# Patient Record
Sex: FEMALE | URBAN - METROPOLITAN AREA
[De-identification: names, ages, dates, MRNs, and addresses within clinical notes are randomized per-mention and may not be internally consistent; named-entity substitution may affect disease eponyms.]

---

## 2023-03-11 ENCOUNTER — CLAIMS CREATED FROM THE CLAIM WINDOW (OUTPATIENT)
Dept: URBAN - METROPOLITAN AREA MEDICAL CENTER 25 | Facility: MEDICAL CENTER | Age: 58
End: 2023-03-11
Payer: COMMERCIAL

## 2023-03-11 ENCOUNTER — OUT OF OFFICE VISIT (OUTPATIENT)
Dept: URBAN - METROPOLITAN AREA MEDICAL CENTER 25 | Facility: MEDICAL CENTER | Age: 58
End: 2023-03-11

## 2023-03-11 DIAGNOSIS — R07.89 ACUTE CHEST WALL PAIN: ICD-10-CM

## 2023-03-11 DIAGNOSIS — K21.9 ACID REFLUX: ICD-10-CM

## 2023-03-11 DIAGNOSIS — R11.0 AM NAUSEA: ICD-10-CM

## 2023-03-11 PROCEDURE — 99204 OFFICE O/P NEW MOD 45 MIN: CPT | Performed by: STUDENT IN AN ORGANIZED HEALTH CARE EDUCATION/TRAINING PROGRAM

## 2023-03-12 ENCOUNTER — LAB OUTSIDE AN ENCOUNTER (OUTPATIENT)
Dept: URBAN - METROPOLITAN AREA CLINIC 19 | Facility: CLINIC | Age: 58
End: 2023-03-12

## 2023-03-15 ENCOUNTER — WEB ENCOUNTER (OUTPATIENT)
Dept: URBAN - METROPOLITAN AREA CLINIC 19 | Facility: CLINIC | Age: 58
End: 2023-03-15

## 2023-03-15 ENCOUNTER — LAB OUTSIDE AN ENCOUNTER (OUTPATIENT)
Dept: URBAN - METROPOLITAN AREA CLINIC 19 | Facility: CLINIC | Age: 58
End: 2023-03-15

## 2023-03-15 ENCOUNTER — TELEPHONE ENCOUNTER (OUTPATIENT)
Dept: URBAN - METROPOLITAN AREA CLINIC 19 | Facility: CLINIC | Age: 58
End: 2023-03-15

## 2023-03-15 ENCOUNTER — OFFICE VISIT (OUTPATIENT)
Dept: URBAN - METROPOLITAN AREA CLINIC 19 | Facility: CLINIC | Age: 58
End: 2023-03-15
Payer: COMMERCIAL

## 2023-03-15 VITALS
BODY MASS INDEX: 27.49 KG/M2 | HEIGHT: 65 IN | WEIGHT: 165 LBS | SYSTOLIC BLOOD PRESSURE: 122 MMHG | DIASTOLIC BLOOD PRESSURE: 82 MMHG

## 2023-03-15 DIAGNOSIS — R19.7 DIARRHEA OF PRESUMED INFECTIOUS ORIGIN: ICD-10-CM

## 2023-03-15 DIAGNOSIS — R07.89 ATYPICAL CHEST PAIN: ICD-10-CM

## 2023-03-15 PROBLEM — 79922009: Status: ACTIVE | Noted: 2023-03-15

## 2023-03-15 PROBLEM — 102589003: Status: ACTIVE | Noted: 2023-03-15

## 2023-03-15 PROBLEM — 386211005: Status: ACTIVE | Noted: 2023-03-15

## 2023-03-15 PROBLEM — 203082005: Status: ACTIVE | Noted: 2023-03-15

## 2023-03-15 PROBLEM — 43240000: Status: ACTIVE | Noted: 2023-03-15

## 2023-03-15 PROCEDURE — 99214 OFFICE O/P EST MOD 30 MIN: CPT | Performed by: STUDENT IN AN ORGANIZED HEALTH CARE EDUCATION/TRAINING PROGRAM

## 2023-03-15 RX ORDER — PANTOPRAZOLE SODIUM 40 MG/1
1 TABLET TABLET, DELAYED RELEASE ORAL ONCE A DAY
Status: ACTIVE | COMMUNITY

## 2023-03-15 RX ORDER — FAMOTIDINE 40 MG/1
1 TABLET AT BEDTIME TABLET, FILM COATED ORAL ONCE A DAY
Status: ACTIVE | COMMUNITY

## 2023-03-15 NOTE — HPI-TODAY'S VISIT:
3/15/2023:  Frances: The pt is a 58 yo F who was seen over the weekend at [facility] for a consult.  Atypical chest pain.  Previously has been following with Dr. Garduno (Olivia Chowdary at [facility].  Was not able to obtain a clinic appt when she called his clinic about 2 weeks ago.  Had epigastric pain, pyrosis which she described as acute, radiating to both sides of her chest and up her jaw.  Not relieved by the PPI, Maalox and her typical gi meds.  Was directed to urgent care or  ER by their office and proceeded to present there.  Had ST and T wave changes on EKG at the ER, with pain relieved by 3 doses of nitroglycerine.  Was seen by cardiology.  Was told normal cardiology workup, including echo.  they signed off and gi was consulted.  She underwent egd with me.  Mild gastropathy noted in the stomach but otherwise normal exam.  Biopsied esophagus to r/o EoE, stomach and small bowel.  Biopsy results are still pending as of today.  Today her concerns are as follows:  She called Dr. Chowdary at discharge and was seen by him in clinic monday.  Pepcid added to her meds.  She was recommended ph manometry testing.  She was not able to get is scheduled till May.  So she came to [facility] after calling and learning that she could get it set up here in April apparently.  Says she has to keep taking nitroglycerin for her issues.  Insists it is a gi issues with the regurgitation and the loose LES.  Discussed with Dr. Chowdary who told her he could recommend a fundoplication.  She is under the impression this is done by GI.  I discussed with her that without a hiatal hernia, fundoplication may not be the right answer for her symptoms, but will evaluate and send to bariatric surgery if the manometry is non-revealing. Offered her Peppermint oil for spasms, which she states does not help. Has not been able to get stool study in the hospital and was discharge.  Was given a stool kit by Dr. Chowdary, and again was unable to give him a sample.  He has had a discussion re post-infectious diarrhea with her. She tells me that she would probably follow up with us in the future as it is closer to where she lives.

## 2023-03-16 ENCOUNTER — TELEPHONE ENCOUNTER (OUTPATIENT)
Dept: URBAN - METROPOLITAN AREA CLINIC 80 | Facility: CLINIC | Age: 58
End: 2023-03-16

## 2023-03-18 LAB — CLOSTRIDIUM DIFFICILE TOXINB,QL REAL TIME PCR: NOT DETECTED

## 2023-03-19 LAB
PERFORMING LAB: (no result)
PERFORMING LAB: (no result)

## 2023-03-23 ENCOUNTER — OFFICE VISIT (OUTPATIENT)
Dept: URBAN - METROPOLITAN AREA MEDICAL CENTER 28 | Facility: MEDICAL CENTER | Age: 58
End: 2023-03-23
Payer: COMMERCIAL

## 2023-03-23 DIAGNOSIS — R07.89 ACUTE CHEST WALL PAIN: ICD-10-CM

## 2023-03-23 DIAGNOSIS — K21.9 ACID REFLUX: ICD-10-CM

## 2023-03-23 PROCEDURE — 91038 ESOPH IMPED FUNCT TEST > 1HR: CPT | Performed by: INTERNAL MEDICINE

## 2023-03-23 PROCEDURE — 91010 ESOPHAGUS MOTILITY STUDY: CPT | Performed by: INTERNAL MEDICINE

## 2023-03-24 ENCOUNTER — TELEPHONE ENCOUNTER (OUTPATIENT)
Dept: URBAN - METROPOLITAN AREA CLINIC 19 | Facility: CLINIC | Age: 58
End: 2023-03-24

## 2023-04-07 ENCOUNTER — WEB ENCOUNTER (OUTPATIENT)
Dept: URBAN - METROPOLITAN AREA CLINIC 19 | Facility: CLINIC | Age: 58
End: 2023-04-07

## 2023-04-12 ENCOUNTER — OFFICE VISIT (OUTPATIENT)
Dept: URBAN - METROPOLITAN AREA CLINIC 128 | Facility: CLINIC | Age: 58
End: 2023-04-12

## 2023-06-01 ENCOUNTER — OFFICE VISIT (OUTPATIENT)
Dept: URBAN - METROPOLITAN AREA CLINIC 19 | Facility: CLINIC | Age: 58
End: 2023-06-01
Payer: COMMERCIAL

## 2023-06-01 ENCOUNTER — LAB OUTSIDE AN ENCOUNTER (OUTPATIENT)
Dept: URBAN - METROPOLITAN AREA CLINIC 19 | Facility: CLINIC | Age: 58
End: 2023-06-01

## 2023-06-01 VITALS
HEIGHT: 65 IN | DIASTOLIC BLOOD PRESSURE: 80 MMHG | SYSTOLIC BLOOD PRESSURE: 120 MMHG | WEIGHT: 154.6 LBS | BODY MASS INDEX: 25.76 KG/M2

## 2023-06-01 DIAGNOSIS — R19.4 CHANGE IN BOWEL HABITS: ICD-10-CM

## 2023-06-01 DIAGNOSIS — R63.0 LACK OF APPETITE: ICD-10-CM

## 2023-06-01 PROBLEM — 79890006: Status: ACTIVE | Noted: 2023-06-01

## 2023-06-01 PROCEDURE — 99214 OFFICE O/P EST MOD 30 MIN: CPT | Performed by: INTERNAL MEDICINE

## 2023-06-01 RX ORDER — FAMOTIDINE 40 MG/1
1 TABLET AT BEDTIME TABLET, FILM COATED ORAL ONCE A DAY
Status: DISCONTINUED | COMMUNITY

## 2023-06-01 RX ORDER — PANTOPRAZOLE SODIUM 40 MG/1
1 TABLET TABLET, DELAYED RELEASE ORAL ONCE A DAY
Status: ACTIVE | COMMUNITY

## 2023-06-01 RX ORDER — SUCRALFATE 1 G/10ML
10 ML ON AN EMPTY STOMACH SUSPENSION ORAL TWICE A DAY
Qty: 600 | Refills: 1 | OUTPATIENT
Start: 2023-06-01 | End: 2023-07-31

## 2023-06-01 RX ORDER — PANTOPRAZOLE SODIUM 40 MG/1
1 TABLET TABLET, DELAYED RELEASE ORAL TWICE A DAY
Qty: 60 TABLET | Refills: 1 | OUTPATIENT
Start: 2023-06-01

## 2023-06-01 RX ORDER — ATORVASTATIN CALCIUM 40 MG/1
1 TABLET TABLET, FILM COATED ORAL ONCE A DAY
Status: ACTIVE | COMMUNITY

## 2023-06-01 RX ORDER — GABAPENTIN 300 MG/1
1 CAPSULE CAPSULE ORAL ONCE A DAY
Status: ACTIVE | COMMUNITY

## 2023-06-01 RX ORDER — METHOCARBAMOL 500 MG/1
1.5 TABLETS TABLET ORAL
Status: ACTIVE | COMMUNITY

## 2023-06-01 NOTE — HPI-TODAY'S VISIT:
Mrs. Hinkle is a 58 year old female with fibromyalgia, SLE, RA, Raynaud's disease. She follows with Dr. Daniels and was last seen by Dr. Daniels on 3/15/2023. She presents today for earlier clinic followup for diarrhea and loss of appetite.   On 5/22/2023 she underwent hernia repair with Nissen fundoplication with Dr. Patterson.  On 5/27/2023 she went to [facility] for headache and neck pain.   Today she reports having no appetite and having diarrhea. She reports her last BM was on 5/26/2023. She reports being on full liquid diet.   She is on protonix 40mg daily.   Prior history is summarized below:  -She was following with Dr. Garduno ([facility] at [facility]. On 12/16/2021 EGD showed normal esophagus, gaping lower esophageal sphincter, and moderate gastritis. -On 3/12/2023 EGD with Dr. Daniels showed nonerosive esopahgitis and erythema in gastric mucosa.

## 2023-06-05 ENCOUNTER — TELEPHONE ENCOUNTER (OUTPATIENT)
Dept: URBAN - METROPOLITAN AREA CLINIC 19 | Facility: CLINIC | Age: 58
End: 2023-06-05

## 2023-06-06 ENCOUNTER — TELEPHONE ENCOUNTER (OUTPATIENT)
Dept: URBAN - METROPOLITAN AREA CLINIC 19 | Facility: CLINIC | Age: 58
End: 2023-06-06

## 2023-06-15 ENCOUNTER — OFFICE VISIT (OUTPATIENT)
Dept: URBAN - METROPOLITAN AREA CLINIC 19 | Facility: CLINIC | Age: 58
End: 2023-06-15

## 2023-07-11 ENCOUNTER — OFFICE VISIT (OUTPATIENT)
Dept: URBAN - METROPOLITAN AREA CLINIC 19 | Facility: CLINIC | Age: 58
End: 2023-07-11

## 2023-08-17 ENCOUNTER — OFFICE VISIT (OUTPATIENT)
Dept: URBAN - METROPOLITAN AREA CLINIC 19 | Facility: CLINIC | Age: 58
End: 2023-08-17

## 2024-01-25 ENCOUNTER — CLAIMS CREATED FROM THE CLAIM WINDOW (OUTPATIENT)
Dept: URBAN - METROPOLITAN AREA MEDICAL CENTER 10 | Facility: MEDICAL CENTER | Age: 59
End: 2024-01-25
Payer: COMMERCIAL

## 2024-01-25 DIAGNOSIS — R63.0 ALMOST NO APPETITE: ICD-10-CM

## 2024-01-25 DIAGNOSIS — D64.89 ANEMIA DUE TO OTHER CAUSE: ICD-10-CM

## 2024-01-25 DIAGNOSIS — R63.4 ABNORMAL INTENTIONAL WEIGHT LOSS: ICD-10-CM

## 2024-01-25 DIAGNOSIS — R10.11 ABDOMINAL BURNING SENSATION IN RIGHT UPPER QUADRANT: ICD-10-CM

## 2024-01-25 PROCEDURE — 99254 IP/OBS CNSLTJ NEW/EST MOD 60: CPT | Performed by: INTERNAL MEDICINE

## 2024-01-25 PROCEDURE — 99222 1ST HOSP IP/OBS MODERATE 55: CPT | Performed by: INTERNAL MEDICINE

## 2024-01-25 PROCEDURE — G8427 DOCREV CUR MEDS BY ELIG CLIN: HCPCS | Performed by: INTERNAL MEDICINE

## 2024-01-26 ENCOUNTER — CLAIMS CREATED FROM THE CLAIM WINDOW (OUTPATIENT)
Dept: URBAN - METROPOLITAN AREA MEDICAL CENTER 10 | Facility: MEDICAL CENTER | Age: 59
End: 2024-01-26
Payer: COMMERCIAL

## 2024-01-26 DIAGNOSIS — R63.4 ABNORMAL INTENTIONAL WEIGHT LOSS: ICD-10-CM

## 2024-01-26 DIAGNOSIS — R63.0 ALMOST NO APPETITE: ICD-10-CM

## 2024-01-26 DIAGNOSIS — D64.89 ANEMIA DUE TO OTHER CAUSE: ICD-10-CM

## 2024-01-26 DIAGNOSIS — R11.2 ACUTE NAUSEA WITH NONBILIOUS VOMITING: ICD-10-CM

## 2024-01-26 PROCEDURE — 99231 SBSQ HOSP IP/OBS SF/LOW 25: CPT | Performed by: INTERNAL MEDICINE

## 2024-01-27 ENCOUNTER — CLAIMS CREATED FROM THE CLAIM WINDOW (OUTPATIENT)
Dept: URBAN - METROPOLITAN AREA MEDICAL CENTER 10 | Facility: MEDICAL CENTER | Age: 59
End: 2024-01-27
Payer: COMMERCIAL

## 2024-01-27 DIAGNOSIS — R63.0 ALMOST NO APPETITE: ICD-10-CM

## 2024-01-27 DIAGNOSIS — K29.60 ADENOPAPILLOMATOSIS GASTRICA: ICD-10-CM

## 2024-01-27 DIAGNOSIS — R10.11 ABDOMINAL BURNING SENSATION IN RIGHT UPPER QUADRANT: ICD-10-CM

## 2024-01-27 DIAGNOSIS — K31.A12 GASTRIC INTESTINAL METAPLASIA WITHOUT DYSPLASIA, INVOLVING THE BODY (CORPUS): ICD-10-CM

## 2024-01-27 PROCEDURE — 43239 EGD BIOPSY SINGLE/MULTIPLE: CPT | Performed by: INTERNAL MEDICINE

## 2024-01-27 PROCEDURE — 43235 EGD DIAGNOSTIC BRUSH WASH: CPT | Performed by: INTERNAL MEDICINE

## 2024-02-13 ENCOUNTER — LAB (OUTPATIENT)
Dept: URBAN - METROPOLITAN AREA CLINIC 35 | Facility: CLINIC | Age: 59
End: 2024-02-13

## 2024-02-13 ENCOUNTER — OV HOSPF/U (OUTPATIENT)
Dept: URBAN - METROPOLITAN AREA CLINIC 35 | Facility: CLINIC | Age: 59
End: 2024-02-13
Payer: COMMERCIAL

## 2024-02-13 VITALS
SYSTOLIC BLOOD PRESSURE: 118 MMHG | BODY MASS INDEX: 21.16 KG/M2 | HEART RATE: 79 BPM | DIASTOLIC BLOOD PRESSURE: 75 MMHG | HEIGHT: 65 IN | OXYGEN SATURATION: 99 % | WEIGHT: 127 LBS

## 2024-02-13 DIAGNOSIS — R11.0 CHRONIC NAUSEA: ICD-10-CM

## 2024-02-13 DIAGNOSIS — K59.01 CONSTIPATION: ICD-10-CM

## 2024-02-13 DIAGNOSIS — K29.40 ATROPHIC GASTRITIS WITHOUT HEMORRHAGE: ICD-10-CM

## 2024-02-13 DIAGNOSIS — R10.30 LOWER ABDOMINAL PAIN: ICD-10-CM

## 2024-02-13 PROBLEM — 84568007: Status: ACTIVE | Noted: 2024-02-13

## 2024-02-13 PROBLEM — 14760008: Status: ACTIVE | Noted: 2024-02-13

## 2024-02-13 PROCEDURE — 99215 OFFICE O/P EST HI 40 MIN: CPT | Performed by: INTERNAL MEDICINE

## 2024-02-13 RX ORDER — CHOLECALCIFEROL (VITAMIN D3) 50 MCG
1 TABLET TABLET ORAL
Status: ACTIVE | COMMUNITY

## 2024-02-13 RX ORDER — OXYCODONE HYDROCHLORIDE AND ACETAMINOPHEN 5; 325 MG/1; MG/1
1 TABLET TABLET ORAL ONCE A DAY
Refills: 0 | Status: ACTIVE | COMMUNITY

## 2024-02-13 RX ORDER — PREGABALIN 75 MG/1
1 CAPSULE CAPSULE ORAL
Status: ACTIVE | COMMUNITY

## 2024-02-13 RX ORDER — ROSUVASTATIN CALCIUM 10 MG/1
1 TABLET TABLET, FILM COATED ORAL ONCE A DAY
Status: ACTIVE | COMMUNITY

## 2024-02-13 RX ORDER — TRAMADOL HYDROCHLORIDE 50 MG/1
2 TABLETS TABLET, FILM COATED ORAL THREE TIMES A DAY
Status: ACTIVE | COMMUNITY

## 2024-02-13 RX ORDER — PREGABALIN 25 MG/1
1 CAPSULE CAPSULE ORAL TWICE A DAY
Status: ACTIVE | COMMUNITY

## 2024-02-13 RX ORDER — MIRTAZAPINE 15 MG/1
1 TABLET ON THE TONGUE AND ALLOW TO DISSOLVE AT BEDTIME TABLET, ORALLY DISINTEGRATING ORAL ONCE A DAY
Status: ACTIVE | COMMUNITY

## 2024-02-13 RX ORDER — TIZANIDINE 4 MG/1
1 TABLET TABLET ORAL
Status: ACTIVE | COMMUNITY

## 2024-02-13 RX ORDER — TOPIRAMATE 25 MG/1
1 TABLET TABLET, FILM COATED ORAL ONCE A DAY
Status: ACTIVE | COMMUNITY

## 2024-02-13 NOTE — HPI-ENDOSCOPY (EGD) FOLLOWUP
58 year old female patient  with previous history of atypical chest pain, fibromyalgia, epigastric pain presents today for her hospital follow up (lumbar and pelvic pain 1/22-1/27/24). Patient mentions unwanted loss of weight and diminished appetite. She also mentions lumbar pain. Patient currently admits no bowel movement since 01.30.2024 with semisolid to liquid consistency assisted by magnesium oxide without  melena, blood, or mucus. Patient mentions rectal pain.    Today: patient is scheduled for  Lumbar sacral MRI at [facility]  Barium Swallow -  1/22/24  Unremarkable    EGD - 1/27/2024 - Impression  - Z-line regular, 38 cm from the incisors.   - Normal esophagus.   - A Nissen fundoplication was found. The wrap appears intact.   - Erythematous mucosa in the antrum. Biopsied.   - Flattening of gastric folds suggesting atrophy. Biopsied.   - Normal pylorus.   - Patchy mild flattening of duodenal folds in second portion duodenum, otherwise normal duodenum.  Path: Full description below. Gastric body: suspicious for autoimmune gastritis. + IM and pseudopyloric gland metaplasia. Negative H. Pylori   Patient is having increased pain throughout lower abdomen, pelvis, and back. This has been happening since the cervical spine epidural she had done Sept 2023, to treat her cervical spine pain. Patient states her neck pain started since the Nissen fundoplication she had; she thinks was related to neck positioning during the surgery as she woke up with neck pain from surgery.  GI wise, at present time, she has no appetite. Patient has lost 13 pound since January 2024. Since Nissen surgery she has lost 53 pounds. She has nausea and dry heaves; unable to have emesis due to fundoplication. + painful spasms in RUQ, it starts on her back. Episodes are random. Patient has been on multiple meds for back pain. At present time she is using Tramadol. Given Percocet but had severe nausea and dry heaving with it. No dysphagia No BM since hospital discharge. No fever. Patient is wondering if she is having : "stomach seizures". Her brother was diagnosed with it. Only when she is in extreme pain, she describes having an altered mind. Patient has appointment at [facility], Spine clinic Feb 28th. She is having a pelvic and lumbar MRI this AM  Of note: Gallstones incidentally found on prior lumbar MRI   Pathology  A. Duodenum, biopsy:  Duodenal mucosa with no significant histopathologic abnormality.  Duodenal villous architecture is intact and there is no increase in intraepithelial lymphocytes.     B. Stomach, antrum, biopsy :  Mild to moderate chronic gastritis involving gastric antral mucosa.  Negative for intestinal metaplasia (Alcian blue/PAS stain).  Giemsa's stain is negative for Helicobacter pylori-like microorganisms.     C. Stomach, body, biopsy:  Moderate chronic gastritis with atrophic changes, foci of intestinal, and pseudopyloric gland metaplasia.  Micronodular and linear enterochromaffin (ECL) like cell hyperplasia. See comment.  Intestinal metaplasia is highlighted by AB/PAS stain.  Negative for H. Pylori organisms (Giemsa stain).

## 2024-02-23 LAB
FERRITIN, SERUM: 39
FOLATE (FOLIC ACID), SERUM: 10
H PYLORI BREATH TEST: NOT DETECTED
INTRINSIC FACTOR BLOCKING: NEGATIVE
IRON BIND.CAP.(TIBC): 376
IRON SATURATION: 16
IRON: 61
PARIETAL CELL AB SCREEN: POSITIVE
PARIETAL CELL AB TITER: (no result)
VITAMIN B12: 382

## 2024-03-27 ENCOUNTER — OV EP (OUTPATIENT)
Dept: URBAN - METROPOLITAN AREA CLINIC 33 | Facility: CLINIC | Age: 59
End: 2024-03-27

## 2024-03-27 VITALS
SYSTOLIC BLOOD PRESSURE: 110 MMHG | HEART RATE: 68 BPM | HEIGHT: 65 IN | DIASTOLIC BLOOD PRESSURE: 75 MMHG | WEIGHT: 125 LBS | OXYGEN SATURATION: 98 % | BODY MASS INDEX: 20.83 KG/M2

## 2024-03-27 RX ORDER — ROSUVASTATIN CALCIUM 10 MG/1
1 TABLET TABLET, FILM COATED ORAL ONCE A DAY
Status: ACTIVE | COMMUNITY

## 2024-03-27 RX ORDER — PREGABALIN 75 MG/1
1 CAPSULE CAPSULE ORAL
Status: ACTIVE | COMMUNITY

## 2024-03-27 RX ORDER — TOPIRAMATE 25 MG/1
1 TABLET TABLET, FILM COATED ORAL ONCE A DAY
Status: ACTIVE | COMMUNITY

## 2024-03-27 RX ORDER — OXYCODONE HYDROCHLORIDE AND ACETAMINOPHEN 5; 325 MG/1; MG/1
1 TABLET TABLET ORAL ONCE A DAY
Refills: 0 | Status: ACTIVE | COMMUNITY

## 2024-03-27 RX ORDER — CHOLECALCIFEROL (VITAMIN D3) 50 MCG
1 TABLET TABLET ORAL
Status: ACTIVE | COMMUNITY

## 2024-03-27 RX ORDER — TRAMADOL HYDROCHLORIDE 50 MG/1
2 TABLETS TABLET, FILM COATED ORAL THREE TIMES A DAY
Status: ACTIVE | COMMUNITY

## 2024-03-27 RX ORDER — MIRTAZAPINE 15 MG/1
1 TABLET ON THE TONGUE AND ALLOW TO DISSOLVE AT BEDTIME TABLET, ORALLY DISINTEGRATING ORAL ONCE A DAY
Status: ACTIVE | COMMUNITY

## 2024-03-27 RX ORDER — TIZANIDINE 4 MG/1
1 TABLET TABLET ORAL
Status: ACTIVE | COMMUNITY

## 2024-03-27 RX ORDER — LINACLOTIDE 145 UG/1
1 CAPSULE AT LEAST 30 MINUTES BEFORE THE FIRST MEAL OF THE DAY ON AN EMPTY STOMACH CAPSULE, GELATIN COATED ORAL ONCE A DAY
Qty: 90 | Refills: 3 | Status: ACTIVE | COMMUNITY
Start: 2024-03-08 | End: 2025-03-03

## 2024-03-27 RX ORDER — PREGABALIN 25 MG/1
1 CAPSULE CAPSULE ORAL TWICE A DAY
Status: ACTIVE | COMMUNITY

## 2024-03-27 NOTE — HPI-TODAY'S VISIT:
58 year old female patient  with previous history of chronic nausea, constipation, atrophic gastritis, weight loss (decreased appetite) presents today for her 1 month follow up. Patient mentions no change in nausea, decreassed appetite and bloating. Patient admits improvement of symptoms constipation with linzess as prescribed. Patient currently  admits 3-4 bowel movements per day with liquid consistency without pain, melena, blood, or mucus.   Patient is still having pain and spasms in RUQ. Still has to force herself to eat. When she eats, she gets sick with nausea and worsening pain. RUQ US in January showed GS (while in the hospital)  Linzess working to well.   Last Appointment  Linzess 145 mcg    NM Hepatobiliary Scan - 03.13.2024  1. No evidence of cystic duct obstruction.  2. The gallbladder ejection fraction is 22%. Normal range is >30% at any time point. These findings can suggest gallbladder dysfunction in the correct clinical setting, but please note that false positive gallbladder ejection fraction studies can occur.    LABs - 2.19.2024  Parietal cell Ab: Positive  Fe + TIBC: NL  Ferritin: NL  IF: NL  H. Pylori BT: NL

## 2024-05-24 ENCOUNTER — DASHBOARD ENCOUNTERS (OUTPATIENT)
Age: 59
End: 2024-05-24

## 2024-05-24 ENCOUNTER — CLAIMS CREATED FROM THE CLAIM WINDOW (OUTPATIENT)
Dept: URBAN - METROPOLITAN AREA CLINIC 35 | Facility: CLINIC | Age: 59
End: 2024-05-24

## 2024-05-24 ENCOUNTER — LAB OUTSIDE AN ENCOUNTER (OUTPATIENT)
Dept: URBAN - METROPOLITAN AREA CLINIC 35 | Facility: CLINIC | Age: 59
End: 2024-05-24

## 2024-05-24 ENCOUNTER — OFFICE VISIT (OUTPATIENT)
Dept: URBAN - METROPOLITAN AREA CLINIC 35 | Facility: CLINIC | Age: 59
End: 2024-05-24
Payer: COMMERCIAL

## 2024-05-24 VITALS
WEIGHT: 125 LBS | SYSTOLIC BLOOD PRESSURE: 112 MMHG | HEIGHT: 65 IN | DIASTOLIC BLOOD PRESSURE: 72 MMHG | BODY MASS INDEX: 20.83 KG/M2

## 2024-05-24 DIAGNOSIS — K59.09 CHRONIC CONSTIPATION: ICD-10-CM

## 2024-05-24 DIAGNOSIS — R10.12 LEFT UPPER QUADRANT PAIN: ICD-10-CM

## 2024-05-24 DIAGNOSIS — Z12.11 ENCOUNTER FOR SCREENING FOR MALIGNANT NEOPLASM OF COLON: ICD-10-CM

## 2024-05-24 DIAGNOSIS — K29.40 ATROPHIC GASTRITIS WITHOUT HEMORRHAGE: ICD-10-CM

## 2024-05-24 PROCEDURE — 99214 OFFICE O/P EST MOD 30 MIN: CPT | Performed by: PHYSICIAN ASSISTANT

## 2024-05-24 RX ORDER — DULOXETINE 30 MG/1
1 CAPSULE CAPSULE, DELAYED RELEASE PELLETS ORAL ONCE A DAY
Status: ACTIVE | COMMUNITY

## 2024-05-24 RX ORDER — MULTIVITAMIN WITH IRON
AS DIRECTED TABLET ORAL
Status: ACTIVE | COMMUNITY

## 2024-05-24 RX ORDER — ROSUVASTATIN CALCIUM 10 MG/1
1 TABLET TABLET, FILM COATED ORAL ONCE A DAY
Status: ACTIVE | COMMUNITY

## 2024-05-24 RX ORDER — POLYETHYLENE GLYCOL 3350, SODIUM SULFATE, SODIUM CHLORIDE, POTASSIUM CHLORIDE, ASCORBIC ACID, SODIUM ASCORBATE 140-9-5.2G
140 MILLILITER KIT ORAL AS DIRECTED
Qty: 1 | Refills: 0 | OUTPATIENT
Start: 2024-05-24 | End: 2024-05-26

## 2024-05-24 RX ORDER — OXCARBAZEPINE 150 MG/1
1 TABLET TABLET, FILM COATED ORAL TWICE A DAY
Status: ACTIVE | COMMUNITY

## 2024-05-29 ENCOUNTER — TELEPHONE ENCOUNTER (OUTPATIENT)
Dept: URBAN - METROPOLITAN AREA CLINIC 35 | Facility: CLINIC | Age: 59
End: 2024-05-29

## 2024-06-01 ENCOUNTER — WEB ENCOUNTER (OUTPATIENT)
Dept: URBAN - METROPOLITAN AREA CLINIC 35 | Facility: CLINIC | Age: 59
End: 2024-06-01

## 2024-07-19 ENCOUNTER — OFFICE VISIT (OUTPATIENT)
Dept: URBAN - METROPOLITAN AREA MEDICAL CENTER 10 | Facility: MEDICAL CENTER | Age: 59
End: 2024-07-19
Payer: COMMERCIAL

## 2024-07-19 DIAGNOSIS — Z12.11 COLON CANCER SCREENING: ICD-10-CM

## 2024-07-19 PROCEDURE — 0528F RCMND FLW-UP 10 YRS DOCD: CPT | Performed by: INTERNAL MEDICINE

## 2024-07-19 PROCEDURE — G0121 COLON CA SCRN NOT HI RSK IND: HCPCS | Performed by: INTERNAL MEDICINE

## 2024-07-19 RX ORDER — OXCARBAZEPINE 150 MG/1
1 TABLET TABLET, FILM COATED ORAL TWICE A DAY
Status: ACTIVE | COMMUNITY

## 2024-07-19 RX ORDER — DULOXETINE 30 MG/1
1 CAPSULE CAPSULE, DELAYED RELEASE PELLETS ORAL ONCE A DAY
Status: ACTIVE | COMMUNITY

## 2024-07-19 RX ORDER — ROSUVASTATIN CALCIUM 10 MG/1
1 TABLET TABLET, FILM COATED ORAL ONCE A DAY
Status: ACTIVE | COMMUNITY

## 2024-07-19 RX ORDER — MULTIVITAMIN WITH IRON
AS DIRECTED TABLET ORAL
Status: ACTIVE | COMMUNITY

## 2024-07-29 ENCOUNTER — LAB OUTSIDE AN ENCOUNTER (OUTPATIENT)
Dept: URBAN - METROPOLITAN AREA CLINIC 35 | Facility: CLINIC | Age: 59
End: 2024-07-29

## 2024-07-29 ENCOUNTER — TELEPHONE ENCOUNTER (OUTPATIENT)
Dept: URBAN - METROPOLITAN AREA CLINIC 35 | Facility: CLINIC | Age: 59
End: 2024-07-29

## 2024-07-29 RX ORDER — ONDANSETRON 4 MG/1
1 TABLET ON THE TONGUE AND ALLOW TO DISSOLVE TABLET, ORALLY DISINTEGRATING ORAL
Qty: 30 TABLET | Refills: 0 | OUTPATIENT
Start: 2024-07-29

## 2024-08-01 ENCOUNTER — OFFICE VISIT (OUTPATIENT)
Dept: URBAN - METROPOLITAN AREA CLINIC 35 | Facility: CLINIC | Age: 59
End: 2024-08-01
Payer: COMMERCIAL

## 2024-08-01 VITALS
WEIGHT: 128 LBS | SYSTOLIC BLOOD PRESSURE: 110 MMHG | HEIGHT: 65 IN | DIASTOLIC BLOOD PRESSURE: 74 MMHG | BODY MASS INDEX: 21.33 KG/M2

## 2024-08-01 DIAGNOSIS — R07.89 OTHER CHEST PAIN: ICD-10-CM

## 2024-08-01 DIAGNOSIS — K29.40 ATROPHIC GASTRITIS WITHOUT HEMORRHAGE: ICD-10-CM

## 2024-08-01 PROCEDURE — 99214 OFFICE O/P EST MOD 30 MIN: CPT | Performed by: PHYSICIAN ASSISTANT

## 2024-08-01 RX ORDER — ROSUVASTATIN CALCIUM 10 MG/1
1 TABLET TABLET, FILM COATED ORAL ONCE A DAY
Status: ACTIVE | COMMUNITY

## 2024-08-01 RX ORDER — ONDANSETRON 4 MG/1
1 TABLET ON THE TONGUE AND ALLOW TO DISSOLVE TABLET, ORALLY DISINTEGRATING ORAL
Qty: 30 TABLET | Refills: 0 | Status: ACTIVE | COMMUNITY
Start: 2024-07-29

## 2024-08-01 RX ORDER — OXCARBAZEPINE 150 MG/1
1 TABLET TABLET, FILM COATED ORAL TWICE A DAY
Status: ACTIVE | COMMUNITY

## 2024-08-01 RX ORDER — DULOXETINE 30 MG/1
1 CAPSULE CAPSULE, DELAYED RELEASE PELLETS ORAL ONCE A DAY
Status: ACTIVE | COMMUNITY

## 2024-08-01 RX ORDER — MULTIVITAMIN WITH IRON
AS DIRECTED TABLET ORAL
Status: ACTIVE | COMMUNITY

## 2024-08-01 NOTE — PHYSICAL EXAM GASTROINTESTINAL
Abdomen , soft, +diffuse tender, nondistended , +guarding or rigidity , no masses palpable , normal bowel sounds , Liver and Spleen,  no hepatosplenomegaly , liver nontender

## 2024-08-01 NOTE — HPI-TODAY'S VISIT:
Patient presents today for a follow up. She admits continued nausea since her colonoscopy 07/19 which was essentially normal. She states that she hasn't been able to have a BM since last Tuesday. She has tried Milk of Mag, an enema, and Linzess 145mcg but nothing seemed to be working. She admitted she went to urgent care and got an Xray and she also went to [facility]. She admits she drank 2 10 oz bottles of magnesium citrate last night and she has been having watery bowel movements. She admits she is having significant pain. She is having watery mud coming out finally.  She also has a UTI, per U/A, however was not given abx.  Zofran not helping with the nausea.  CT abd/pelvis Moderate feces within the colon and rectum  Colonoscopy report shows: -Preparation of the colon was fair. -Diverticulosis in the sigmoid colon. -Anal papilla were hypertrophied. -No specimens collected.  Last visit: Patient presents today for a follow up of nausea, constipation, and weight loss. She denies any continued nausea, decreased appetite, and bloating. She denies any weight loss. She denies taking Linzess 72 mcg. She currently admits normal bowel habits. She admits she has been having sharp pain under the left rib cageand it shoots across her ribs. Onset was two weeks ago with no provoking factors.  She did have her gallbladder removal in April with Dr. Patterson. She admits to pain in her tailbone that began in September of last year. She admits she is doing pelvic floor therapy and is unsure if she needs a colonoscopy for this. She has constant pain in her tailbone, and started after her cervical epidural.   Last visit: 58-year-old female patient  with previous history of chronic nausea, constipation, atrophic gastritis, weight loss (decreased appetite), presents today for her 1-month follow up. Patient mentions no change in nausea, decreased appetite and bloating. Patient admits improvement of symptoms constipation with Linzess as prescribed. Patient currently  admits 3-4 bowel movements per day with liquid consistency without pain, melena, blood, or mucus.   Patient is still having pain and spasms in RUQ. Still has to force herself to eat. When she eats, she gets sick with nausea and worsening pain. RUQ US in January showed GS (while in the hospital)  Linzess working too well.   Last Appointment  Linzess 145 mcg    NM Hepatobiliary Scan - 03.13.2024  1. No evidence of cystic duct obstruction.  2. The gallbladder ejection fraction is 22%. Normal range is >30% at any time point. These findings can suggest gallbladder dysfunction in the correct clinical setting, but please note that false positive gallbladder ejection fraction studies can occur.    LABs - 2.19.2024  Parietal cell Ab: Positive  Fe + TIBC: NL  Ferritin: NL  IF: NL  H. Pylori BT: NL

## 2024-08-02 ENCOUNTER — LAB OUTSIDE AN ENCOUNTER (OUTPATIENT)
Dept: URBAN - METROPOLITAN AREA CLINIC 35 | Facility: CLINIC | Age: 59
End: 2024-08-02

## 2024-08-02 ENCOUNTER — WEB ENCOUNTER (OUTPATIENT)
Dept: URBAN - METROPOLITAN AREA CLINIC 35 | Facility: CLINIC | Age: 59
End: 2024-08-02

## 2024-08-02 ENCOUNTER — TELEPHONE ENCOUNTER (OUTPATIENT)
Dept: URBAN - METROPOLITAN AREA CLINIC 35 | Facility: CLINIC | Age: 59
End: 2024-08-02

## 2024-08-05 ENCOUNTER — OFFICE VISIT (OUTPATIENT)
Dept: URBAN - METROPOLITAN AREA CLINIC 35 | Facility: CLINIC | Age: 59
End: 2024-08-05
Payer: COMMERCIAL

## 2024-08-05 ENCOUNTER — LAB OUTSIDE AN ENCOUNTER (OUTPATIENT)
Dept: URBAN - METROPOLITAN AREA CLINIC 35 | Facility: CLINIC | Age: 59
End: 2024-08-05

## 2024-08-05 ENCOUNTER — TELEPHONE ENCOUNTER (OUTPATIENT)
Dept: URBAN - METROPOLITAN AREA CLINIC 35 | Facility: CLINIC | Age: 59
End: 2024-08-05

## 2024-08-05 VITALS
DIASTOLIC BLOOD PRESSURE: 72 MMHG | BODY MASS INDEX: 20.99 KG/M2 | WEIGHT: 126 LBS | SYSTOLIC BLOOD PRESSURE: 114 MMHG | HEIGHT: 65 IN

## 2024-08-05 DIAGNOSIS — K29.40 ATROPHIC GASTRITIS WITHOUT HEMORRHAGE: ICD-10-CM

## 2024-08-05 DIAGNOSIS — R10.13 EPIGASTRIC PAIN: ICD-10-CM

## 2024-08-05 DIAGNOSIS — R11.2 NAUSEA AND VOMITING, UNSPECIFIED VOMITING TYPE: ICD-10-CM

## 2024-08-05 DIAGNOSIS — R68.81 EARLY SATIETY: ICD-10-CM

## 2024-08-05 PROCEDURE — 99214 OFFICE O/P EST MOD 30 MIN: CPT | Performed by: PHYSICIAN ASSISTANT

## 2024-08-05 RX ORDER — MULTIVITAMIN WITH IRON
AS DIRECTED TABLET ORAL
Status: ACTIVE | COMMUNITY

## 2024-08-05 RX ORDER — ONDANSETRON 4 MG/1
1 TABLET ON THE TONGUE AND ALLOW TO DISSOLVE TABLET, ORALLY DISINTEGRATING ORAL
Qty: 30 TABLET | Refills: 0 | Status: ACTIVE | COMMUNITY
Start: 2024-07-29

## 2024-08-05 RX ORDER — DULOXETINE 30 MG/1
1 CAPSULE CAPSULE, DELAYED RELEASE PELLETS ORAL ONCE A DAY
Status: ACTIVE | COMMUNITY

## 2024-08-05 RX ORDER — ROSUVASTATIN CALCIUM 10 MG/1
1 TABLET TABLET, FILM COATED ORAL ONCE A DAY
Status: ACTIVE | COMMUNITY

## 2024-08-05 RX ORDER — OXCARBAZEPINE 150 MG/1
1 TABLET TABLET, FILM COATED ORAL TWICE A DAY
Status: ACTIVE | COMMUNITY

## 2024-08-05 NOTE — PHYSICAL EXAM GASTROINTESTINAL
Abdomen , soft, +epigastric tenderness, nondistended , no guarding or rigidity , no masses palpable , normal bowel sounds , Liver and Spleen,  no hepatosplenomegaly , liver nontender

## 2024-08-05 NOTE — HPI-TODAY'S VISIT:
NORI (8/1) HPI: The patient presents with ongoing abdominal pain following a colonoscopy performed on the 19th. They describe the pain as a pressure sensation located centrally and radiating to the right side. They were evaluated by their primary care physician, Dr. Rosales, who referred them for admission. A CT scan was performed at [facility] [place] the previous day, which reportedly showed no abnormalities. The patient was not informed of any concerns regarding peritonitis or perforation.  Last visit: Patient presents today for a follow up. She admits continued nausea since her colonoscopy 07/19 which was essentially normal. She states that she hasn't been able to have a BM since last Tuesday. She has tried Milk of Mag, an enema, and Linzess 145mcg but nothing seemed to be working. She admitted she went to urgent care and got an Xray and she also went to [facility]. She admits she drank 2 10 oz bottles of magnesium citrate last night and she has been having watery bowel movements. She admits she is having significant pain. She is having watery mud coming out finally.  She also has a UTI, per U/A, however was not given abx.  Zofran not helping with the nausea.  CT abd/pelvis Moderate feces within the colon and rectum  Colonoscopy report shows: -Preparation of the colon was fair. -Diverticulosis in the sigmoid colon. -Anal papilla were hypertrophied. -No specimens collected.  Last visit: Patient presents today for a follow up of nausea, constipation, and weight loss. She denies any continued nausea, decreased appetite, and bloating. She denies any weight loss. She denies taking Linzess 72 mcg. She currently admits normal bowel habits. She admits she has been having sharp pain under the left rib cageand it shoots across her ribs. Onset was two weeks ago with no provoking factors.  She did have her gallbladder removal in April with Dr. Patterson. She admits to pain in her tailbone that began in September of last year. She admits she is doing pelvic floor therapy and is unsure if she needs a colonoscopy for this. She has constant pain in her tailbone, and started after her cervical epidural.   Last visit: 58-year-old female patient  with previous history of chronic nausea, constipation, atrophic gastritis, weight loss (decreased appetite), presents today for her 1-month follow up. Patient mentions no change in nausea, decreased appetite and bloating. Patient admits improvement of symptoms constipation with Linzess as prescribed. Patient currently  admits 3-4 bowel movements per day with liquid consistency without pain, melena, blood, or mucus.   Patient is still having pain and spasms in RUQ. Still has to force herself to eat. When she eats, she gets sick with nausea and worsening pain. RUQ US in January showed GS (while in the hospital)  Linzess working too well.   Last Appointment  Linzess 145 mcg    NM Hepatobiliary Scan - 03.13.2024  1. No evidence of cystic duct obstruction.  2. The gallbladder ejection fraction is 22%. Normal range is >30% at any time point. These findings can suggest gallbladder dysfunction in the correct clinical setting, but please note that false positive gallbladder ejection fraction studies can occur.    LABs - 2.19.2024  Parietal cell Ab: Positive  Fe + TIBC: NL  Ferritin: NL  IF: NL  H. Pylori BT: NL

## 2024-08-12 ENCOUNTER — OFFICE VISIT (OUTPATIENT)
Dept: URBAN - METROPOLITAN AREA CLINIC 35 | Facility: CLINIC | Age: 59
End: 2024-08-12

## 2024-08-28 ENCOUNTER — OFFICE VISIT (OUTPATIENT)
Dept: URBAN - METROPOLITAN AREA CLINIC 33 | Facility: CLINIC | Age: 59
End: 2024-08-28
Payer: COMMERCIAL

## 2024-08-28 ENCOUNTER — LAB OUTSIDE AN ENCOUNTER (OUTPATIENT)
Dept: URBAN - METROPOLITAN AREA CLINIC 33 | Facility: CLINIC | Age: 59
End: 2024-08-28

## 2024-08-28 VITALS
WEIGHT: 124 LBS | HEART RATE: 73 BPM | OXYGEN SATURATION: 99 % | HEIGHT: 65 IN | DIASTOLIC BLOOD PRESSURE: 85 MMHG | SYSTOLIC BLOOD PRESSURE: 120 MMHG | BODY MASS INDEX: 20.66 KG/M2

## 2024-08-28 DIAGNOSIS — R11.0 CHRONIC NAUSEA: ICD-10-CM

## 2024-08-28 DIAGNOSIS — R63.0 ANOREXIA: ICD-10-CM

## 2024-08-28 DIAGNOSIS — R10.11 RUQ ABDOMINAL PAIN: ICD-10-CM

## 2024-08-28 DIAGNOSIS — R68.81 EARLY SATIETY: ICD-10-CM

## 2024-08-28 DIAGNOSIS — K59.04 CHRONIC IDIOPATHIC CONSTIPATION: ICD-10-CM

## 2024-08-28 DIAGNOSIS — K29.40 ATROPHIC GASTRITIS WITHOUT HEMORRHAGE: ICD-10-CM

## 2024-08-28 PROBLEM — 79890006: Status: ACTIVE | Noted: 2024-08-28

## 2024-08-28 PROBLEM — 82934008: Status: ACTIVE | Noted: 2024-08-28

## 2024-08-28 PROCEDURE — 99214 OFFICE O/P EST MOD 30 MIN: CPT | Performed by: INTERNAL MEDICINE

## 2024-08-28 RX ORDER — AMITRIPTYLINE HYDROCHLORIDE 25 MG/1
1 TABLET AT BEDTIME TABLET, FILM COATED ORAL ONCE A DAY
Status: ACTIVE | COMMUNITY

## 2024-08-28 RX ORDER — OXCARBAZEPINE 150 MG/1
1 TABLET TABLET, FILM COATED ORAL TWICE A DAY
Status: ACTIVE | COMMUNITY

## 2024-08-28 RX ORDER — ONDANSETRON 4 MG/1
1 TABLET ON THE TONGUE AND ALLOW TO DISSOLVE TABLET, ORALLY DISINTEGRATING ORAL
Qty: 30 TABLET | Refills: 0 | Status: ACTIVE | COMMUNITY
Start: 2024-07-29

## 2024-08-28 RX ORDER — SUCRALFATE 1 G/1
1 TABLET ON AN EMPTY STOMACH TABLET ORAL
Qty: 90 | Refills: 3 | OUTPATIENT
Start: 2024-08-28 | End: 2024-12-25

## 2024-08-28 RX ORDER — MULTIVITAMIN WITH IRON
AS DIRECTED TABLET ORAL
Status: ACTIVE | COMMUNITY

## 2024-08-28 RX ORDER — DULOXETINE 30 MG/1
1 CAPSULE CAPSULE, DELAYED RELEASE PELLETS ORAL ONCE A DAY
Status: ACTIVE | COMMUNITY

## 2024-08-28 RX ORDER — ROSUVASTATIN CALCIUM 10 MG/1
1 TABLET TABLET, FILM COATED ORAL ONCE A DAY
Status: ACTIVE | COMMUNITY

## 2024-08-28 RX ORDER — LINACLOTIDE 145 UG/1
1 CAPSULE AT LEAST 30 MINUTES BEFORE THE FIRST MEAL OF THE DAY ON AN EMPTY STOMACH CAPSULE, GELATIN COATED ORAL ONCE A DAY
Status: ACTIVE | COMMUNITY

## 2024-08-28 NOTE — HPI-TODAY'S VISIT:
59-year-old female patient with previous history of Nausea/vomiting, Early satiety, Epigastric pain, atrophic gastritis, anemia, GERD, Arthritis, Fibromyalgia, Stroke presents today for her 1-month follow-up. Patient mentions she continues to have RUQ pain, nausea and hyporexia. Patient mentions she is taking Linzess 145 mcg as prescribed. Patient currently mentions 2 bowel movements per day with semisolid consistency without blood, melena or pain.   Of note, patient woke up very combative after colonoscopy, requiring multiple people to hold patient, so she would not hurt herself, and temporary restraints. Same thing happened after her Nissen fundoplication and after her cholecystectomy.  Patient continue to complain of RUQ pain, which is mostly around rib cage, worse when she lies on right or left side, and patient is starting to wonder if pain is related to her back issues. She has chronic back pain, and sees neurology and has seen pain clinic.  She is still complaining of feeling food, poor appetite, not eating much because of lack of appetite.   ABD CT - 07/31/2024  1. Moderate feces within the colon and rectum  2. No evidence of bowel perforation or obstruction    08/05/2024 Now finally having BM's with Linzess 145 mcg. Plan is to see how she does on Linzess, see if nausea and pain improves. Patient is also finishing her Cipro for UTI. Instructed patient to call us with update next week. I will also give her a f/u elder with me end of August.  Recent GES was NL, and I informed pt of NL results.   Gastric Emptying Study - 08/08/2024 NL gastric emptying study   Last appointment - Paula  Antiparietal AB: Positive  NL Fe studies and Vit B12   EGD due 01/2025  Yearly Fe and B12 studies

## 2024-09-11 ENCOUNTER — LAB OUTSIDE AN ENCOUNTER (OUTPATIENT)
Dept: URBAN - METROPOLITAN AREA CLINIC 35 | Facility: CLINIC | Age: 59
End: 2024-09-11

## 2024-09-22 ENCOUNTER — TELEPHONE ENCOUNTER (OUTPATIENT)
Dept: URBAN - METROPOLITAN AREA CLINIC 35 | Facility: CLINIC | Age: 59
End: 2024-09-22